# Patient Record
(demographics unavailable — no encounter records)

---

## 2024-12-17 NOTE — ASSESSMENT
[FreeTextEntry1] : Patient is currently being worked up with neurology for his right arm and hand trembling.  For his shoulder, PT prescription was given for now.  He takes over-the-counter anti-inflammatory.  Patient is going to complete his workup with neurology first prior to doing any injections or further imaging of his shoulder as it may be related to his shoulder/arm pain.  Follow-up with him in about 6 weeks for repeat evaluation

## 2024-12-17 NOTE — IMAGING
[de-identified] : On examination of the right shoulder no palpable tenderness to the shoulder.  His active forward flexion and abduction is to approximately 90 degrees.  Passively I can range him to approximately 140 degrees although with pain and restriction.  Negative drop arm, mild weakness to rotator cuff resistance.  Positive Romero.  X-rays right shoulder 3 views obtained in the office today no obvious fracture or dislocation

## 2024-12-17 NOTE — HISTORY OF PRESENT ILLNESS
[de-identified] : 70-year-old male comes in today for an evaluation of his right shoulder pain that has been going on now for about a week.  No specific injury or trauma.  He states he is currently being worked up with neurology for trembling in his right hand, unsure if it is related.  Had an MRI, waiting for EEG.  He denies any neck pain.  History of bipolar, heart disease, cardiac stents.  Patient takes atorvastatin, bupropion, tamsulosin, finasteride, fluoxetine, divalproex.